# Patient Record
Sex: MALE | Race: WHITE | NOT HISPANIC OR LATINO
[De-identification: names, ages, dates, MRNs, and addresses within clinical notes are randomized per-mention and may not be internally consistent; named-entity substitution may affect disease eponyms.]

---

## 2023-03-29 ENCOUNTER — APPOINTMENT (OUTPATIENT)
Dept: ORTHOPEDIC SURGERY | Facility: CLINIC | Age: 68
End: 2023-03-29
Payer: MEDICARE

## 2023-03-29 VITALS — BODY MASS INDEX: 29.12 KG/M2 | WEIGHT: 215 LBS | HEIGHT: 72 IN

## 2023-03-29 PROCEDURE — 73030 X-RAY EXAM OF SHOULDER: CPT | Mod: LT

## 2023-03-29 PROCEDURE — 99213 OFFICE O/P EST LOW 20 MIN: CPT

## 2023-03-29 NOTE — ASSESSMENT
[FreeTextEntry1] : ice\par modify activity\par \par RE:  FABIO COTTER \par \par Acct #- [id number] \par \par Attention:  Nurse Reviewer /Medical Director\par \par  \par Based on my patient's condition, I strongly believe that the MRI L shoulder\par  is medically.necessary.  \par The patient has failed oral meds, injections and PT and conservative treatment in combination or by themselves and therefore needs the MRI.  \par The MRI will dictate further treatment t recommendations.\par try lido patch\par

## 2023-03-29 NOTE — PHYSICAL EXAM
[Sitting] : sitting [Moderate] : moderate [5___] : internal rotation 5[unfilled]/5 [Left] : left shoulder [There are no fractures, subluxations or dislocations. No significant abnormalities are seen] : There are no fractures, subluxations or dislocations. No significant abnormalities are seen [] : no scapular winging [Degenerative change] : Degenerative change [TWNoteComboBox7] : active forward flexion 170 degrees [TWNoteComboBox6] : internal rotation L2 [de-identified] : external rotation 80 degrees

## 2023-03-29 NOTE — HISTORY OF PRESENT ILLNESS
[6] : 6 [0] : 0 [Rest] : rest [de-identified] : 67 year old LHD male with pain in the left shoulder, symptoms have been present for at least a year and worsening, He has pain with reaching over head, behind back and sleeping at night. No radicular complaints. Prior history of left distal biceps tendon rupture. He has not been able to play basketball because of the symptoms in his shoulder, he can't shoot. OTCs with some help [] : no [FreeTextEntry1] : left arm  [FreeTextEntry5] : pt was playing basketball and reached back and felt a crack in his arm about 2 years ago, pain has since returned  [de-identified] : movement  [de-identified] : 03/2021 [de-identified] :   [de-identified] : 2021

## 2023-03-29 NOTE — DISCUSSION/SUMMARY
[de-identified] : MRI left shoulder eval for cuff tear.\par \par \par RE:  FABIO SOLORZANOINA \par \par Acct #- [id number] \par \par Attention:  Nurse Reviewer /Medical Director\par \par  \par Based on my patient's condition, I strongly believe that the MRI is medically.necessary.  \par The patient has failed oral meds, injections and PT and conservative treatment in combination or by themselves and therefore needs the MRI.  \par The MRI will dictate further treatment t recommendations.\par

## 2023-04-05 ENCOUNTER — FORM ENCOUNTER (OUTPATIENT)
Age: 68
End: 2023-04-05

## 2023-04-06 ENCOUNTER — APPOINTMENT (OUTPATIENT)
Dept: MRI IMAGING | Facility: CLINIC | Age: 68
End: 2023-04-06
Payer: MEDICARE

## 2023-04-06 PROCEDURE — 73221 MRI JOINT UPR EXTREM W/O DYE: CPT | Mod: LT,MH

## 2023-04-13 ENCOUNTER — APPOINTMENT (OUTPATIENT)
Dept: ORTHOPEDIC SURGERY | Facility: CLINIC | Age: 68
End: 2023-04-13
Payer: MEDICARE

## 2023-04-13 VITALS — BODY MASS INDEX: 29.12 KG/M2 | WEIGHT: 215 LBS | HEIGHT: 72 IN

## 2023-04-13 PROCEDURE — L3670: CPT

## 2023-04-13 PROCEDURE — 99214 OFFICE O/P EST MOD 30 MIN: CPT

## 2023-04-13 NOTE — HISTORY OF PRESENT ILLNESS
[6] : 6 [0] : 0 [de-identified] :  pain in the left shoulder, symptoms have been present for at least a year and worsening, He has pain with reaching over head, behind back and sleeping at night. No radicular complaints. Prior history of left distal biceps tendon rupture. He had MRI [FreeTextEntry1] : left shoulder  [de-identified] : advil used as needed

## 2023-04-13 NOTE — DATA REVIEWED
[MRI] : MRI [Left] : left [Shoulder] : shoulder [Report was reviewed and noted in the chart] : The report was reviewed and noted in the chart [FreeTextEntry1] : 1. AC joint arthrosis. 2. Infraspinatus tendinopathy and fraying with traction cyst at the humeral head and no fracture. 3. Supraspinatus tendinopathy and fraying with 10 x 12 mm high-grade insertional tear with subtle fullthickness extension. Articular fraying and low-grade tear proximal to the full-thickness component. Traction  cyst at the humeral head largest 4 mm and no fracture. 4. Superior labral tear. Posterior inferior labral tear. 8 mm posterior superior labral cyst. Biceps tendinopathy  extending through the anchor with no tenosynovitis. 5. 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 Marrow edema adjacent to the cartilage loss over the posterior glenoid.

## 2023-04-13 NOTE — PHYSICAL EXAM
[Left] : left shoulder [Sitting] : sitting [Moderate] : moderate [5___] : internal rotation 5[unfilled]/5 [] : no scapular winging [TWNoteComboBox7] : active forward flexion 170 degrees [TWNoteComboBox6] : internal rotation L2 [de-identified] : external rotation 80 degrees

## 2023-04-13 NOTE — ASSESSMENT
[FreeTextEntry1] : Patient allowed to gently start resuming activities. \par Discussed change to medication prescription and usage. \par Bracing options discussed with patient. \par Activity modification as needed\par Discussed poss future surgery, pt deciding\par letter of med necessity for L shoulder AS RCR, labral debridement and sad surg discussion questions answered also for shoulder abduction pillow sling

## 2023-04-13 NOTE — DISCUSSION/SUMMARY
[de-identified] : MRI left shoulder eval for cuff tear.\par \par \par RE:  FABIO SOLORZANOINA \par \par Acct #- [id number] \par \par Attention:  Nurse Reviewer /Medical Director\par \par  \par Based on my patient's condition, I strongly believe that the MRI is medically.necessary.  \par The patient has failed oral meds, injections and PT and conservative treatment in combination or by themselves and therefore needs the MRI.  \par The MRI will dictate further treatment t recommendations.\par

## 2023-05-16 ENCOUNTER — APPOINTMENT (OUTPATIENT)
Age: 68
End: 2023-05-16
Payer: MEDICARE

## 2023-05-16 PROCEDURE — 29823 SHO ARTHRS SRG XTNSV DBRDMT: CPT | Mod: LT

## 2023-05-16 PROCEDURE — 29827 SHO ARTHRS SRG RT8TR CUF RPR: CPT | Mod: LT

## 2023-05-16 PROCEDURE — ZZZZZ: CPT

## 2023-05-16 PROCEDURE — 29826 SHO ARTHRS SRG DECOMPRESSION: CPT | Mod: LT

## 2023-05-16 PROCEDURE — 29824 SHO ARTHRS SRG DSTL CLAVICLC: CPT | Mod: LT

## 2023-05-16 RX ORDER — OXYCODONE AND ACETAMINOPHEN 5; 325 MG/1; MG/1
5-325 TABLET ORAL
Qty: 40 | Refills: 0 | Status: ACTIVE | COMMUNITY
Start: 2023-05-16 | End: 1900-01-01

## 2023-05-25 ENCOUNTER — APPOINTMENT (OUTPATIENT)
Dept: ORTHOPEDIC SURGERY | Facility: CLINIC | Age: 68
End: 2023-05-25
Payer: MEDICARE

## 2023-05-25 PROCEDURE — 99024 POSTOP FOLLOW-UP VISIT: CPT

## 2023-05-25 NOTE — HISTORY OF PRESENT ILLNESS
[] : Post Surgical Visit: yes [de-identified] : Patient s/p left shoulder arthroscopic RTC repair, labral debridement, SAD, DCE, GHD on 5/16/23. Patient well maintained in the sling, no fever/chills. Pain is manageable.  [de-identified] : 05/16/2023 [de-identified] : left shoulder arthroscopy

## 2023-05-25 NOTE — DISCUSSION/SUMMARY
[de-identified] : Surgical findings reviewed with the patient.\par Activities and restrictions reviewed.\par Start PT and dc sling the week of 6/5/23.\par HEP discussed.\par \par 05/25/2023 \par \par  RE:  FABIO COTTER \par \par Acct #- [id number] \par \par \par Attention:  Nurse Reviewer /Medical Director\par \par I am writing this letter as a medical necessity for PT program.\par Patient has tried analgesics, non-steroid anti-inflammatory agents, \par hot or cold compresses,injections of corticosteroids, etc)  which in combination or by themselves has not worked.\par Based on my patient's condition, I strongly believe that the PT is medically needed.\par  \par Thank you for your time and consideration.   \par

## 2023-07-13 ENCOUNTER — APPOINTMENT (OUTPATIENT)
Dept: ORTHOPEDIC SURGERY | Facility: CLINIC | Age: 68
End: 2023-07-13
Payer: MEDICARE

## 2023-07-13 VITALS — BODY MASS INDEX: 29.12 KG/M2 | HEIGHT: 72 IN | WEIGHT: 215 LBS

## 2023-07-13 DIAGNOSIS — M77.8 OTHER ENTHESOPATHIES, NOT ELSEWHERE CLASSIFIED: ICD-10-CM

## 2023-07-13 PROCEDURE — 99024 POSTOP FOLLOW-UP VISIT: CPT

## 2023-07-13 NOTE — HISTORY OF PRESENT ILLNESS
[5] : 5 [0] : 0 [] : Post Surgical Visit: yes [de-identified] : Patient s/p left shoulder arthroscopic RTC repair, labral debridement, SAD, DCE, GHD on 5/16/23. Patient attending PT program, he is doing well.  [FreeTextEntry1] : left shoulder  [de-identified] : physical therapy and advil used as needed  [de-identified] : 05/16/2023 [de-identified] : left shoulder arthroscopy

## 2023-07-13 NOTE — PHYSICAL EXAM
[Left] : left shoulder [] : no scapular winging [TWNoteComboBox7] : active forward flexion 170 degrees [TWNoteComboBox6] : internal rotation 10 degrees [de-identified] : external rotation 60 degrees

## 2023-07-13 NOTE — DISCUSSION/SUMMARY
[de-identified] : Continue PT program\par HEP reviewed\par Activities and restrictions. \par \par \par 07/13/2023 \par \par  RE:  FABIO COTTER \par \par Acct #- [id number] \par \par \par Attention:  Nurse Reviewer /Medical Director\par \par I am writing this letter as a medical necessity for PT program.\par Patient has tried analgesics, non-steroid anti-inflammatory agents, \par hot or cold compresses,injections of corticosteroids, etc)  which in combination or by themselves has not worked.\par Based on my patient's condition, I strongly believe that the PT is medically needed.\par  \par Thank you for your time and consideration.   \par

## 2023-08-17 ENCOUNTER — APPOINTMENT (OUTPATIENT)
Dept: ORTHOPEDIC SURGERY | Facility: CLINIC | Age: 68
End: 2023-08-17
Payer: MEDICARE

## 2023-08-17 VITALS — BODY MASS INDEX: 29.12 KG/M2 | WEIGHT: 215 LBS | HEIGHT: 72 IN

## 2023-08-17 PROCEDURE — 99213 OFFICE O/P EST LOW 20 MIN: CPT

## 2023-08-17 NOTE — PHYSICAL EXAM
[Left] : left shoulder [] : motor and sensory intact distally [TWNoteComboBox7] : active forward flexion 180 degrees [TWNoteComboBox6] : internal rotation 10 degrees [de-identified] : external rotation 60 degrees

## 2023-08-17 NOTE — HISTORY OF PRESENT ILLNESS
[] : Post Surgical Visit: yes [de-identified] : Patient s/p left shoulder arthroscopic RTC repair, labral debridement, SAD, DCE, GHD on 5/16/23. Patient attending PT program, he is doing well.  [5] : 5 [0] : 0 [FreeTextEntry1] : left shoulder  [FreeTextEntry5] : Patient feeling good [de-identified] : physical therapy and advil used as needed  [de-identified] : 05/16/2023 [de-identified] : left shoulder arthroscopy

## 2023-08-17 NOTE — DISCUSSION/SUMMARY
[de-identified] : Continue PT program HEP reviewed Activities and restrictions.    08/17/2023    RE:  [facility]AB[facility] SOLORZANOINA   [id number]    Attention:  Nurse Reviewer /Medical Director  I am writing this letter as a medical necessity for PT program. Patient has tried analgesics, non-steroid anti-inflammatory agents,  hot or cold compresses,injections of corticosteroids, etc)  which in combination or by themselves has not worked. Based on my patient's condition, I strongly believe that the PT is medically needed.   Thank you for your time and consideration.

## 2023-09-21 ENCOUNTER — APPOINTMENT (OUTPATIENT)
Dept: ORTHOPEDIC SURGERY | Facility: CLINIC | Age: 68
End: 2023-09-21
Payer: MEDICARE

## 2023-09-21 VITALS — BODY MASS INDEX: 29.12 KG/M2 | WEIGHT: 215 LBS | HEIGHT: 72 IN

## 2023-09-21 DIAGNOSIS — S46.012A STRAIN OF MUSCLE(S) AND TENDON(S) OF THE ROTATOR CUFF OF LEFT SHOULDER, INITIAL ENCOUNTER: ICD-10-CM

## 2023-09-21 PROCEDURE — 99213 OFFICE O/P EST LOW 20 MIN: CPT

## 2023-11-02 ENCOUNTER — APPOINTMENT (OUTPATIENT)
Dept: ORTHOPEDIC SURGERY | Facility: CLINIC | Age: 68
End: 2023-11-02
Payer: MEDICARE

## 2023-11-02 VITALS — WEIGHT: 215 LBS | BODY MASS INDEX: 29.12 KG/M2 | HEIGHT: 72 IN

## 2023-11-02 DIAGNOSIS — M19.012 PRIMARY OSTEOARTHRITIS, LEFT SHOULDER: ICD-10-CM

## 2023-11-02 DIAGNOSIS — S43.432D SUPERIOR GLENOID LABRUM LESION OF LEFT SHOULDER, SUBSEQUENT ENCOUNTER: ICD-10-CM

## 2023-11-02 PROCEDURE — 99213 OFFICE O/P EST LOW 20 MIN: CPT

## 2023-11-21 ENCOUNTER — APPOINTMENT (OUTPATIENT)
Dept: ORTHOPEDIC SURGERY | Facility: CLINIC | Age: 68
End: 2023-11-21
Payer: MEDICARE

## 2023-11-21 VITALS — HEIGHT: 72 IN | BODY MASS INDEX: 29.12 KG/M2 | WEIGHT: 215 LBS

## 2023-11-21 DIAGNOSIS — Z78.9 OTHER SPECIFIED HEALTH STATUS: ICD-10-CM

## 2023-11-21 PROCEDURE — 73564 X-RAY EXAM KNEE 4 OR MORE: CPT | Mod: LT

## 2023-11-21 PROCEDURE — 99214 OFFICE O/P EST MOD 30 MIN: CPT

## 2023-11-21 RX ORDER — MELOXICAM 15 MG/1
15 TABLET ORAL
Qty: 30 | Refills: 0 | Status: ACTIVE | COMMUNITY
Start: 2023-11-21 | End: 1900-01-01

## 2023-11-22 ENCOUNTER — APPOINTMENT (OUTPATIENT)
Dept: MRI IMAGING | Facility: CLINIC | Age: 68
End: 2023-11-22
Payer: MEDICARE

## 2023-11-22 PROCEDURE — 73721 MRI JNT OF LWR EXTRE W/O DYE: CPT | Mod: LT,MH

## 2023-11-29 ENCOUNTER — APPOINTMENT (OUTPATIENT)
Dept: ORTHOPEDIC SURGERY | Facility: CLINIC | Age: 68
End: 2023-11-29
Payer: MEDICARE

## 2023-11-29 PROCEDURE — 99213 OFFICE O/P EST LOW 20 MIN: CPT

## 2023-12-12 ENCOUNTER — APPOINTMENT (OUTPATIENT)
Age: 68
End: 2023-12-12
Payer: MEDICARE

## 2023-12-12 PROCEDURE — 29881 ARTHRS KNE SRG MNISECTMY M/L: CPT | Mod: AS,LT

## 2023-12-12 PROCEDURE — 29881 ARTHRS KNE SRG MNISECTMY M/L: CPT | Mod: LT

## 2023-12-12 RX ORDER — ASPIRIN 325 MG/1
325 TABLET, FILM COATED ORAL DAILY
Qty: 14 | Refills: 0 | Status: ACTIVE | COMMUNITY
Start: 2023-12-12 | End: 1900-01-01

## 2023-12-12 RX ORDER — HYDROCODONE BITARTRATE AND ACETAMINOPHEN 5; 325 MG/1; MG/1
5-325 TABLET ORAL
Qty: 20 | Refills: 0 | Status: ACTIVE | COMMUNITY
Start: 2023-12-12 | End: 1900-01-01

## 2023-12-19 ENCOUNTER — APPOINTMENT (OUTPATIENT)
Dept: ORTHOPEDIC SURGERY | Facility: CLINIC | Age: 68
End: 2023-12-19
Payer: MEDICARE

## 2023-12-19 VITALS — BODY MASS INDEX: 29.12 KG/M2 | HEIGHT: 72 IN | WEIGHT: 215 LBS

## 2023-12-19 DIAGNOSIS — Z00.00 ENCOUNTER FOR GENERAL ADULT MEDICAL EXAMINATION W/OUT ABNORMAL FINDINGS: ICD-10-CM

## 2023-12-19 PROCEDURE — 99024 POSTOP FOLLOW-UP VISIT: CPT

## 2023-12-19 PROCEDURE — J3490M: CUSTOM | Mod: NC

## 2023-12-19 PROCEDURE — 20611 DRAIN/INJ JOINT/BURSA W/US: CPT | Mod: 58,LT

## 2023-12-19 NOTE — DISCUSSION/SUMMARY
[de-identified] : modify activities use elastic sleeve try OTC meds ice as needed try topical lidocaine 12/19/2023    RE:  FABIO COTTER   Acct #- [id number]    Attention:  Nurse Reviewer /Medical Director  I am writing this letter as a medical necessity for PT program. Patient has tried analgesics, non-steroid anti-inflammatory agents,  hot or cold compresses,injections of corticosteroids, etc)  which in combination or by themselves has not worked. Based on my patient's condition, I strongly believe that the PT is medically needed.   Thank you for your time and consideration.

## 2023-12-19 NOTE — HISTORY OF PRESENT ILLNESS
[Gradual] : gradual [5] : 5 [Dull/Aching] : dull/aching [Intermittent] : intermittent [Rest] : rest [Left Leg] : left leg [Sudden] : sudden [7] : 7 [Stabbing] : stabbing [Exercising] : exercising [de-identified] : had R knee med meniscectomy and grade 2 chondral changes no fevers nor chills, some swelling [] : Post Surgical Visit: no [FreeTextEntry1] : l;eft knee [FreeTextEntry5] : Pt is having the same pain as last visit and is here to go over mri results  [de-identified] : mris  [de-identified] : 12/12/23

## 2023-12-19 NOTE — PROCEDURE
[Large Joint Injection] : Large joint injection [Left] : of the left [Knee] : knee [Pain] : pain [Inflammation] : inflammation [Betadine] : betadine [___ cc    0.25%] : Bupivacaine (Marcaine) ~Vcc of 0.25%  [Effusion] : effusion [] : Patient tolerated procedure well [Call if redness, pain or fever occur] : call if redness, pain or fever occur [Apply ice for 15min out of every hour for the next 12-24 hours as tolerated] : apply ice for 15 minutes out of every hour for the next 12-24 hours as tolerated [Patient was advised to rest the joint(s) for ____ days] : patient was advised to rest the joint(s) for [unfilled] days [de-identified] : 37 [de-identified] : blood tinged

## 2023-12-19 NOTE — PHYSICAL EXAM
[Left] : left knee [5___] : quadriceps 5[unfilled]/5 [Equivocal] : equivocal Gamaliel [] : mildly antalgic [TWNoteComboBox7] : flexion 125 degrees

## 2023-12-20 ENCOUNTER — APPOINTMENT (OUTPATIENT)
Dept: ORTHOPEDIC SURGERY | Facility: CLINIC | Age: 68
End: 2023-12-20

## 2024-01-04 ENCOUNTER — APPOINTMENT (OUTPATIENT)
Dept: ORTHOPEDIC SURGERY | Facility: CLINIC | Age: 69
End: 2024-01-04
Payer: MEDICARE

## 2024-01-04 VITALS — BODY MASS INDEX: 29.12 KG/M2 | HEIGHT: 72 IN | WEIGHT: 215 LBS

## 2024-01-04 PROCEDURE — 99024 POSTOP FOLLOW-UP VISIT: CPT

## 2024-01-17 ENCOUNTER — APPOINTMENT (OUTPATIENT)
Dept: ORTHOPEDIC SURGERY | Facility: CLINIC | Age: 69
End: 2024-01-17
Payer: MEDICARE

## 2024-01-17 ENCOUNTER — NON-APPOINTMENT (OUTPATIENT)
Age: 69
End: 2024-01-17

## 2024-01-17 DIAGNOSIS — M70.52 OTHER BURSITIS OF KNEE, LEFT KNEE: ICD-10-CM

## 2024-01-17 PROCEDURE — J3490M: CUSTOM | Mod: NC

## 2024-01-17 PROCEDURE — 20611 DRAIN/INJ JOINT/BURSA W/US: CPT | Mod: 79,LT

## 2024-01-17 PROCEDURE — 99024 POSTOP FOLLOW-UP VISIT: CPT

## 2024-01-17 RX ORDER — CELECOXIB 200 MG/1
200 CAPSULE ORAL
Qty: 30 | Refills: 0 | Status: ACTIVE | COMMUNITY
Start: 2024-01-17 | End: 1900-01-01

## 2024-01-17 NOTE — PROCEDURE
[Large Joint Injection] : Large joint injection [Left] : of the left [Knee] : knee [Pain] : pain [Inflammation] : inflammation [Betadine] : betadine [___ cc    0.25%] : Bupivacaine (Marcaine) ~Vcc of 0.25%  [Effusion] : effusion [] : Patient tolerated procedure well [Call if redness, pain or fever occur] : call if redness, pain or fever occur [Apply ice for 15min out of every hour for the next 12-24 hours as tolerated] : apply ice for 15 minutes out of every hour for the next 12-24 hours as tolerated [Patient was advised to rest the joint(s) for ____ days] : patient was advised to rest the joint(s) for [unfilled] days [de-identified] : 37 [de-identified] : blood tinged

## 2024-01-17 NOTE — HISTORY OF PRESENT ILLNESS
[Gradual] : gradual [Dull/Aching] : dull/aching [Intermittent] : intermittent [Rest] : rest [7] : 7 [6] : 6 [Meds] : meds [Standing] : standing [Walking] : walking [Stairs] : stairs [de-identified] : had left knee med meniscectomy and grade 2 chondral changes [facility] [place], on 12/12/24. he was doing better a week ago and this week the knee has been painful. Mostly inner aspect of the knee. No calf pain.  [] : no [FreeTextEntry1] : left knee  [FreeTextEntry5] : Patient is having pain knee. [FreeTextEntry9] : advil  [de-identified] : PT 2x a week.  [de-identified] : 12/12/23

## 2024-01-17 NOTE — DISCUSSION/SUMMARY
[de-identified] : Case discussed. Patient had OA on arthroscopy which was three weeks ago.  Offered visco, will hold off. PT program. NSAIDS Ice Return as scheduled.

## 2024-01-19 ENCOUNTER — APPOINTMENT (OUTPATIENT)
Dept: ORTHOPEDIC SURGERY | Facility: CLINIC | Age: 69
End: 2024-01-19
Payer: MEDICARE

## 2024-01-19 VITALS — BODY MASS INDEX: 29.12 KG/M2 | WEIGHT: 215 LBS | HEIGHT: 72 IN

## 2024-01-19 DIAGNOSIS — M16.11 UNILATERAL PRIMARY OSTEOARTHRITIS, RIGHT HIP: ICD-10-CM

## 2024-01-19 PROCEDURE — 72100 X-RAY EXAM L-S SPINE 2/3 VWS: CPT

## 2024-01-19 PROCEDURE — 72170 X-RAY EXAM OF PELVIS: CPT

## 2024-01-19 PROCEDURE — 99215 OFFICE O/P EST HI 40 MIN: CPT | Mod: 24

## 2024-01-19 NOTE — ASSESSMENT
[FreeTextEntry1] : 68 year M with right hip OA, mild on XR but severe on Mri in 2021, failied PT and CSI repeat MRI of the right hip and fu after completion injection gave 1 day of relief.

## 2024-01-19 NOTE — IMAGING
[de-identified] : Constitutional: well developed and well nourished, able to communicate Cardiovascular: Peripheral vascular exam is grossly normal Neurologic: Alert and oriented, no acute distress. Skin: normal skin with no ulcers, rashes, or lesions Pulmonary: No respiratory distress, breathing comfortably on room air Lymphatics: No obvious lymphadenopathy or lymphedema in areas examined  LOWER EXTREMITY/ RIGHT HIP EXAM Standing pelvic alignment: Symmetric with no Trendelenburg Atrophy: none Ecchymosis/swelling: none  Range of Motion Hip: Flexion/extension/ER/IR     / EX 20/ ER 45/ IR 20 / AB 60 /AD 20 Impingement with flexion adduction and internal rotation: POS Contracture: none Snapping hip: negative Greater trochanter: no tenderness  Neurovascular Distal extremities: warm to touch Sensation to light touch: intact Muscle strength: 5/5  [id number] Xrays of the Right hip 3v were taken demonstrating HO and MRI demonstrating mild OA and labral tearing.

## 2024-01-19 NOTE — HISTORY OF PRESENT ILLNESS
[Gradual] : gradual [5] : 5 [Dull/Aching] : dull/aching [de-identified] : 01/19/2024 Mr. FABIO COTTER is a 68 year male that comes in today with a chief complaint of right hip, pain sometimes radiate from the lower back. Saw Dr. Vidales in 2021, had IA CSI with no relief. Has not had any treatment since. States pain is more constant. Hip MRI done in 2021 showing labral tear and degen OA with HO.  [] : no

## 2024-01-23 ENCOUNTER — APPOINTMENT (OUTPATIENT)
Dept: MRI IMAGING | Facility: CLINIC | Age: 69
End: 2024-01-23
Payer: MEDICARE

## 2024-01-23 PROCEDURE — 73721 MRI JNT OF LWR EXTRE W/O DYE: CPT | Mod: RT,MH

## 2024-01-30 ENCOUNTER — APPOINTMENT (OUTPATIENT)
Dept: ORTHOPEDIC SURGERY | Facility: CLINIC | Age: 69
End: 2024-01-30
Payer: MEDICARE

## 2024-01-30 VITALS — BODY MASS INDEX: 29.12 KG/M2 | WEIGHT: 215 LBS | HEIGHT: 72 IN

## 2024-01-30 DIAGNOSIS — M24.159 OTHER ARTICULAR CARTILAGE DISORDERS, UNSPECIFIED HIP: ICD-10-CM

## 2024-01-30 PROCEDURE — 99214 OFFICE O/P EST MOD 30 MIN: CPT | Mod: 24

## 2024-01-30 RX ORDER — CELECOXIB 200 MG/1
200 CAPSULE ORAL
Qty: 60 | Refills: 0 | Status: ACTIVE | COMMUNITY
Start: 2024-01-30 | End: 1900-01-01

## 2024-01-30 NOTE — HISTORY OF PRESENT ILLNESS
[FreeTextEntry1] : left knee  [Gradual] : gradual [5] : 5 [4] : 4 [Stabbing] : stabbing [Intermittent] : intermittent [Rest] : rest [Exercising] : exercising [de-identified] : Patient s/p left knee arthroscopy, he was doing well after the surgery and over the past couple of days symptoms increased. Pain inner aspect of the knee, worse with walking, standing. No swelling [] : no [FreeTextEntry5] : Pt has been feeling increased pain since last visit  [de-identified] : 12/12/24 [de-identified] : medial meniscectomy

## 2024-01-30 NOTE — IMAGING
[de-identified] : Constitutional: well developed and well nourished, able to communicate Cardiovascular: Peripheral vascular exam is grossly normal Neurologic: Alert and oriented, no acute distress. Skin: normal skin with no ulcers, rashes, or lesions Pulmonary: No respiratory distress, breathing comfortably on room air Lymphatics: No obvious lymphadenopathy or lymphedema in areas examined  LOWER EXTREMITY/ RIGHT HIP EXAM Standing pelvic alignment: Symmetric with no Trendelenburg Atrophy: none Ecchymosis/swelling: none  Range of Motion Hip: Flexion/extension/ER/IR     / EX 20/ ER 45/ IR 20 / AB 60 /AD 20 Impingement with flexion adduction and internal rotation: POS Contracture: none Snapping hip: negative Greater trochanter: no tenderness  Neurovascular Distal extremities: warm to touch Sensation to light touch: intact Muscle strength: 5/5  [id number] Xrays of the Right hip 3v were taken demonstrating HO and MRI demonstrating mild OA and labral tearing.

## 2024-01-30 NOTE — HISTORY OF PRESENT ILLNESS
[Gradual] : gradual [5] : 5 [Dull/Aching] : dull/aching [de-identified] : 01/19/2024 Mr. FABIO COTTER is a 68 year male that comes in today with a chief complaint of right hip, pain sometimes radiate from the lower back. Saw Dr. Vidales in 2021, had IA CSI with no relief. Has not had any treatment since. States pain is more constant. Hip MRI done in 2021 showing labral tear and degen OA with HO.   01/30/2024 Mr. FABIO COTTER is a 68-year male that comes in today. for follow up right hip.  [] : no

## 2024-01-30 NOTE — PHYSICAL EXAM
[Left] : left knee [] : no deformities of patellar tendon [FreeTextEntry3] : medially from acupuincture.

## 2024-01-30 NOTE — HISTORY OF PRESENT ILLNESS
[FreeTextEntry1] : left knee  [Gradual] : gradual [5] : 5 [4] : 4 [Stabbing] : stabbing [Intermittent] : intermittent [Rest] : rest [Exercising] : exercising [de-identified] : Patient s/p left knee arthroscopy, he was doing well after the surgery and over the past couple of days symptoms increased. Pain inner aspect of the knee, worse with walking, standing. No swelling [] : no [FreeTextEntry5] : Pt has been feeling increased pain since last visit  [de-identified] : 12/12/24 [de-identified] : medial meniscectomy

## 2024-01-30 NOTE — ASSESSMENT
[FreeTextEntry1] : 68 year M with right hip OA, mild on XR but severe on Mri in 2021, failied PT and CSI repeat MRI of the right hip and fu after completion injection gave 1 day of relief.   01/30/2024 MRI with mild labral tearing, mild OA Physical therapy and NSAIDs (celebrex) was prescribed

## 2024-01-30 NOTE — PROCEDURE
[FreeTextEntry3] : Large Joint Injection / Aspiration: Lidocaine, Zilretta Ultrasound and Guidance Lidocaine: 3 cc. Needle size: 18 gauge , 1.5 inch.  Zilretta: An Injection of Zilretta 32 Units 5ml ,  was injected into the left knee  Ultrasound Guidance was used  Ultrasound guided injection was performed of the knee, visualization of the needle and placement of injection was performed without complication.   Large Joint Injection was performed because of pain and inflammation. Anesthesia: ethyl chloride sprayed topically.. Patient has tried OTC's including aspirin, Ibuprofen, Aleve etc or prescription NSAIDS, and/or exercises at home and/ or physical therapy without satisfactory response. After verbal consent using sterile preparation and technique. The risks, benefits, and alternatives to aspiration were explained in full to the patient. Risks outlined include but are not limited to infection, sepsis, bleeding, scarring, skin discoloration, temporary increase in pain, syncopal episode, failure to resolve symptoms, allergic reaction and symptom recurrence. Patient understood the risks. All questions were answered. After discussion of options, patient requested an aspiration. Oral informed consent was obtained and sterile prep was done of the injection site. Sterile technique was utilized for the procedure including the preparation of the solutions used for the aspiration. Patient tolerated the procedure well. Advised to ice the injection site this evening. Prep with betadine locally to site. Sterile technique used. Patient tolerated procedure well. Post Procedure Instructions: Patient was advised to call if redness, pain, or fever occur and apply ice for 15 min. out of every hour for the next 12-24 hours as tolerated. patient was advised to rest the joint(s) for 1 days.

## 2024-01-31 ENCOUNTER — APPOINTMENT (OUTPATIENT)
Dept: ORTHOPEDIC SURGERY | Facility: CLINIC | Age: 69
End: 2024-01-31
Payer: MEDICARE

## 2024-01-31 PROCEDURE — 99024 POSTOP FOLLOW-UP VISIT: CPT

## 2024-01-31 RX ORDER — CELECOXIB 200 MG/1
200 CAPSULE ORAL
Qty: 30 | Refills: 0 | Status: ACTIVE | COMMUNITY
Start: 2024-01-31 | End: 1900-01-01

## 2024-01-31 NOTE — DISCUSSION/SUMMARY
[de-identified] : Case discussed. Patient had OA on arthroscopy which was three weeks ago.  Offered visco, will hold off. modify activities use elastic sleeve try OTC meds ice as needed try topical lidocaine reviewed current medications used by this patient 01/31/2024    [facility]  [facility]AB[facility] COTTER   [id number]    Attention:  Nurse Reviewer /Medical Director  I am writing this letter as a medical necessity for PT program. Patient has tried analgesics, non-steroid anti-inflammatory agents,  hot or cold compresses,injections of corticosteroids, etc)  which in combination or by themselves has not worked. Based on my patient's condition, I strongly believe that the PT is medically needed.   Thank you for your time and consideration.

## 2024-01-31 NOTE — HISTORY OF PRESENT ILLNESS
[Gradual] : gradual [7] : 7 [6] : 6 [Dull/Aching] : dull/aching [Intermittent] : intermittent [Rest] : rest [Meds] : meds [Standing] : standing [Walking] : walking [Stairs] : stairs [de-identified] : had left knee med meniscectomy and grade 2 chondral changes [facility] [place], on 12/12/24. he whad csi with some help, intermittent pain [facility] [] : no [FreeTextEntry1] : left knee  [FreeTextEntry5] : Patient is having pain knee. [FreeTextEntry9] : advil  [de-identified] : PT 2x a week.  [de-identified] : 12/12/23

## 2024-01-31 NOTE — PHYSICAL EXAM
[Left] : left knee [5___] : quadriceps 5[unfilled]/5 [Negative] : negative William's [] : mildly antalgic [TWNoteComboBox7] : flexion 125 degrees

## 2024-02-28 ENCOUNTER — APPOINTMENT (OUTPATIENT)
Dept: ORTHOPEDIC SURGERY | Facility: CLINIC | Age: 69
End: 2024-02-28
Payer: MEDICARE

## 2024-02-28 DIAGNOSIS — S83.242A OTHER TEAR OF MEDIAL MENISCUS, CURRENT INJURY, LEFT KNEE, INITIAL ENCOUNTER: ICD-10-CM

## 2024-02-28 DIAGNOSIS — S83.412D SPRAIN OF MEDIAL COLLATERAL LIGAMENT OF LEFT KNEE, SUBSEQUENT ENCOUNTER: ICD-10-CM

## 2024-02-28 PROCEDURE — 99024 POSTOP FOLLOW-UP VISIT: CPT

## 2024-02-28 NOTE — HISTORY OF PRESENT ILLNESS
[Gradual] : gradual [6] : 6 [7] : 7 [Dull/Aching] : dull/aching [Intermittent] : intermittent [Rest] : rest [Meds] : meds [Standing] : standing [Walking] : walking [Stairs] : stairs [de-identified] : had left knee med meniscectomy and grade 2 chondral changes [facility], on 12/12/24. he had csi with some help, he is reffing and uses celabcdexpertsx [FreeTextEntry1] : left knee  [] : no [FreeTextEntry5] : Patient is having pain knee. [FreeTextEntry9] : advil  [de-identified] : PT 2x a week.  [de-identified] : 12/12/23 [de-identified] : meniscectomy

## 2024-02-28 NOTE — DISCUSSION/SUMMARY
[de-identified] : 02/28/2024    RE:  FABIO COTTER   [id number]    Attention:  Nurse Reviewer /Medical Director  I am writing this letter as a medical necessity for PT program. Patient has tried analgesics, non-steroid anti-inflammatory agents,  hot or cold compresses,injections of corticosteroids, etc)  which in combination or by themselves has not worked. Based on my patient's condition, I strongly believe that the PT is medically needed. modify activities use elastic sleeve try OTC meds ice as needed try topical lidocaine reviewed current medications used by this patient   Thank you for your time and consideration.

## 2024-02-28 NOTE — PHYSICAL EXAM
[Left] : left knee [5___] : quadriceps 5[unfilled]/5 [Negative] : negative William's [] : no medial joint line tenderness [TWNoteComboBox7] : flexion 130 degrees

## 2024-04-17 ENCOUNTER — APPOINTMENT (OUTPATIENT)
Dept: ORTHOPEDIC SURGERY | Facility: CLINIC | Age: 69
End: 2024-04-17
Payer: MEDICARE

## 2024-04-17 PROCEDURE — 99213 OFFICE O/P EST LOW 20 MIN: CPT | Mod: 25

## 2024-04-17 PROCEDURE — 20611 DRAIN/INJ JOINT/BURSA W/US: CPT | Mod: LT

## 2024-04-17 NOTE — DISCUSSION/SUMMARY
[de-identified] : 04/17/2024    RE:  FABIO VAHE   Acct #- [id number]    Attention:  Nurse Reviewer /Medical Director  I am writing this letter as a medical necessity for PT program. Patient has tried analgesics, non-steroid anti-inflammatory agents,  hot or cold compresses,injections of corticosteroids, etc)  which in combination or by themselves has not worked. Based on my patient's condition, I strongly believe that the PT is medically needed. modify activities use elastic sleeve try OTC meds ice as needed try topical lidocaine reviewed current medications used by this patient   Thank you for your time and consideration.    04/17/2024  RE:  FABIO COTTER   [id number]  Attention:  Nurse Reviewer /Medical Director  I am writing this letter as a medical necessity for HA euflexxa L knee Patient has tried analgesics, non-steroid anti-inflammatory agents,  physical therapy, hot or cold compresses,injections of corticosteroids, etc)  which in combination or by themselves has not worked. Based on my patient's condition, I strongly believe that the Hyaluronic aid injections is medically needed.   Thank you for your time and consideration.

## 2024-04-17 NOTE — HISTORY OF PRESENT ILLNESS
[Gradual] : gradual [7] : 7 [6] : 6 [Dull/Aching] : dull/aching [Intermittent] : intermittent [Rest] : rest [Meds] : meds [Standing] : standing [Walking] : walking [Stairs] : stairs [de-identified] : had left knee med meniscectomy and grade 2 chondral changes [facility], on 12/12/24. he had csi with some help, he is has stiffness [] : no [FreeTextEntry1] : left knee  [FreeTextEntry5] : Patient is having pain knee. [FreeTextEntry9] : advil  [de-identified] : PT 2x a week.  [de-identified] : 12/12/23 [de-identified] : meniscectomy

## 2024-04-17 NOTE — PHYSICAL EXAM
[Left] : left knee [5___] : quadriceps 5[unfilled]/5 [Negative] : negative William's [] : mildly antalgic [TWNoteComboBox7] : flexion 130 degrees

## 2024-04-17 NOTE — PROCEDURE
[FreeTextEntry3] : Euflexxa (Large Joint) with Ultrasound Guidance Viscosupplementation Injection: X-ray evidence of Osteoarthritis on this or prior visit and Patient has tried OTC's including aspirin, Ibuprofen, Aleve etc or prescription NSAIDS, and/or exercises at home and/ or physical therapy without satisfactory response.  An injection of Euflexxa 2ml _#__1_ was injected into the left knee(s). after verbal consent using sterile technique. The risks, benefits, and alternatives to Viscosupplementation injection were explained in full to the patient. Risks outlined include but are not limited to infection, sepsis, bleeding, scarring, skin discoloration, temporary increase in pain, syncopal episode, failure to resolve symptoms, allergic reaction, and symptom recurrence. Signs and symptoms of infection reviewed and patient advised to call immediately for redness, fevers, and/or chills. Patient understood the risks. All questions were answered. After discussion of options, patient requested Viscosupplementation. Oral informed consent was obtained and sterile prep was done of the injection site. Sterile technique was used without complications. The patient tolerated the procedure well. Ice tonight to the injection site.   Ultrasound Guidance was used for the following reasons: altered anatomic landmarks because of erosive arthritis.   Ultrasound guided injection was performed of the knee, visualization of the needle and placement of injection was performed without complication.

## 2024-05-01 ENCOUNTER — APPOINTMENT (OUTPATIENT)
Dept: ORTHOPEDIC SURGERY | Facility: CLINIC | Age: 69
End: 2024-05-01
Payer: MEDICARE

## 2024-05-01 PROCEDURE — 20611 DRAIN/INJ JOINT/BURSA W/US: CPT | Mod: LT

## 2024-05-01 NOTE — PHYSICAL EXAM
[Left] : left knee [5___] : quadriceps 5[unfilled]/5 [Negative] : negative William's [] : no effusion [TWNoteComboBox7] : flexion 130 degrees

## 2024-05-08 ENCOUNTER — APPOINTMENT (OUTPATIENT)
Dept: ORTHOPEDIC SURGERY | Facility: CLINIC | Age: 69
End: 2024-05-08
Payer: MEDICARE

## 2024-05-08 DIAGNOSIS — M17.12 UNILATERAL PRIMARY OSTEOARTHRITIS, LEFT KNEE: ICD-10-CM

## 2024-05-08 PROCEDURE — 20611 DRAIN/INJ JOINT/BURSA W/US: CPT | Mod: LT

## 2024-05-08 NOTE — HISTORY OF PRESENT ILLNESS
[de-identified] : had left knee med meniscectomy and grade 2 chondral changes [facility], on 12/12/24. he had csi with some help, he is has stiffness, for euflexxa #3

## 2024-05-08 NOTE — DISCUSSION/SUMMARY
[de-identified] : Rest, ice, activity modification.    Frequency of visco and csi reviewed. Return in 6 weeks as needed.

## 2024-08-19 NOTE — HISTORY OF PRESENT ILLNESS
[de-identified] : had left knee med meniscectomy and grade 2 chondral changes [facility], on 12/12/24. he had csi with some help, he is has stiffness, for euflexxa #2 regular